# Patient Record
Sex: MALE | Race: WHITE
[De-identification: names, ages, dates, MRNs, and addresses within clinical notes are randomized per-mention and may not be internally consistent; named-entity substitution may affect disease eponyms.]

---

## 2018-05-31 ENCOUNTER — HOSPITAL ENCOUNTER (OUTPATIENT)
Dept: HOSPITAL 19 - COL.RAD | Age: 73
End: 2018-05-31
Attending: UROLOGY
Payer: MEDICARE

## 2018-05-31 DIAGNOSIS — N50.3: Primary | ICD-10-CM

## 2019-02-26 ENCOUNTER — HOSPITAL ENCOUNTER (INPATIENT)
Dept: HOSPITAL 19 - COL.ER | Age: 74
LOS: 3 days | Discharge: HOME | DRG: 419 | End: 2019-03-01
Attending: HOSPITALIST | Admitting: HOSPITALIST
Payer: MEDICARE

## 2019-02-26 VITALS — HEIGHT: 69 IN | BODY MASS INDEX: 27 KG/M2 | WEIGHT: 182.32 LBS

## 2019-02-26 DIAGNOSIS — I45.10: ICD-10-CM

## 2019-02-26 DIAGNOSIS — K80.00: Primary | ICD-10-CM

## 2019-02-26 DIAGNOSIS — R00.1: ICD-10-CM

## 2019-02-26 DIAGNOSIS — Z82.49: ICD-10-CM

## 2019-02-26 DIAGNOSIS — K21.9: ICD-10-CM

## 2019-02-26 DIAGNOSIS — N40.0: ICD-10-CM

## 2019-02-26 LAB
ALBUMIN SERPL-MCNC: 4.1 GM/DL (ref 3.5–5)
ALP SERPL-CCNC: 83 U/L (ref 50–136)
ALT SERPL-CCNC: 27 U/L (ref 21–72)
ANION GAP SERPL CALC-SCNC: 9 MMOL/L (ref 7–16)
AST SERPL-CCNC: 29 U/L (ref 15–37)
BASOPHILS # BLD: 0 10*3/UL (ref 0–0.2)
BASOPHILS NFR BLD AUTO: 0.4 % (ref 0–2)
BILIRUB SERPL-MCNC: 1.2 MG/DL (ref 0–1)
BUN SERPL-MCNC: 18 MG/DL (ref 9–20)
CALCIUM SERPL-MCNC: 9.1 MG/DL (ref 8.4–10.2)
CHLORIDE SERPL-SCNC: 104 MMOL/L (ref 98–107)
CO2 SERPL-SCNC: 24 MMOL/L (ref 22–30)
CREAT SERPL-SCNC: 0.89 MG/DL (ref 0.66–1.25)
CRP SERPL-MCNC: < 0.5 MG/DL (ref 0–0.9)
EOSINOPHIL # BLD: 0.1 10*3/UL (ref 0–0.7)
EOSINOPHIL NFR BLD: 1 % (ref 0–4)
ERYTHROCYTE [DISTWIDTH] IN BLOOD BY AUTOMATED COUNT: 12.3 % (ref 11.5–14.5)
GLUCOSE SERPL-MCNC: 118 MG/DL (ref 74–106)
GRANULOCYTES # BLD AUTO: 67.9 % (ref 42.2–75.2)
HCT VFR BLD AUTO: 44.9 % (ref 42–52)
HGB BLD-MCNC: 15.9 G/DL (ref 13.5–18)
LIPASE SERPL-CCNC: 217 U/L (ref 23–300)
LYMPHOCYTES # BLD: 2 10*3/UL (ref 1.2–3.4)
LYMPHOCYTES NFR BLD: 22.1 % (ref 20–51)
MCH RBC QN AUTO: 31 PG (ref 27–31)
MCHC RBC AUTO-ENTMCNC: 35 G/DL (ref 33–37)
MCV RBC AUTO: 86 FL (ref 80–100)
MONOCYTES # BLD: 0.7 10*3/UL (ref 0.1–0.6)
MONOCYTES NFR BLD AUTO: 8.2 % (ref 1.7–9.3)
NEUTROPHILS # BLD: 6 10*3/UL (ref 1.4–6.5)
PLATELET # BLD AUTO: 199 K/MM3 (ref 130–400)
PMV BLD AUTO: 9.1 FL (ref 7.4–10.4)
POTASSIUM SERPL-SCNC: 3.7 MMOL/L (ref 3.4–5)
PROT SERPL-MCNC: 7.6 GM/DL (ref 6.4–8.2)
RBC # BLD AUTO: 5.21 M/MM3 (ref 4.2–5.6)
SODIUM SERPL-SCNC: 136 MMOL/L (ref 137–145)
TROPONIN I SERPL-MCNC: < 0.012 NG/ML (ref 0–0.04)

## 2019-02-26 PROCEDURE — A9500 TC99M SESTAMIBI: HCPCS

## 2019-02-26 PROCEDURE — C1760 CLOSURE DEV, VASC: HCPCS

## 2019-02-26 PROCEDURE — A4216 STERILE WATER/SALINE, 10 ML: HCPCS

## 2019-02-26 PROCEDURE — C1894 INTRO/SHEATH, NON-LASER: HCPCS

## 2019-02-27 VITALS — OXYGEN SATURATION: 97 %

## 2019-02-27 VITALS — SYSTOLIC BLOOD PRESSURE: 120 MMHG | HEART RATE: 52 BPM | DIASTOLIC BLOOD PRESSURE: 67 MMHG | TEMPERATURE: 98.6 F

## 2019-02-27 VITALS — OXYGEN SATURATION: 96 %

## 2019-02-27 VITALS — SYSTOLIC BLOOD PRESSURE: 126 MMHG | DIASTOLIC BLOOD PRESSURE: 79 MMHG | HEART RATE: 54 BPM

## 2019-02-27 VITALS — OXYGEN SATURATION: 94 %

## 2019-02-27 VITALS — OXYGEN SATURATION: 95 %

## 2019-02-27 VITALS — TEMPERATURE: 98 F | HEART RATE: 54 BPM | DIASTOLIC BLOOD PRESSURE: 57 MMHG | SYSTOLIC BLOOD PRESSURE: 148 MMHG

## 2019-02-27 VITALS — HEART RATE: 111 BPM | SYSTOLIC BLOOD PRESSURE: 116 MMHG | DIASTOLIC BLOOD PRESSURE: 62 MMHG

## 2019-02-27 VITALS — TEMPERATURE: 98.2 F | HEART RATE: 50 BPM | DIASTOLIC BLOOD PRESSURE: 52 MMHG | SYSTOLIC BLOOD PRESSURE: 98 MMHG

## 2019-02-27 VITALS — HEART RATE: 45 BPM | TEMPERATURE: 98 F | SYSTOLIC BLOOD PRESSURE: 99 MMHG | DIASTOLIC BLOOD PRESSURE: 54 MMHG

## 2019-02-27 VITALS — OXYGEN SATURATION: 99 %

## 2019-02-27 VITALS — DIASTOLIC BLOOD PRESSURE: 81 MMHG | SYSTOLIC BLOOD PRESSURE: 154 MMHG | HEART RATE: 82 BPM

## 2019-02-27 VITALS — TEMPERATURE: 98.2 F | SYSTOLIC BLOOD PRESSURE: 98 MMHG | HEART RATE: 50 BPM | DIASTOLIC BLOOD PRESSURE: 52 MMHG

## 2019-02-27 VITALS — OXYGEN SATURATION: 98 %

## 2019-02-27 VITALS — DIASTOLIC BLOOD PRESSURE: 79 MMHG | SYSTOLIC BLOOD PRESSURE: 126 MMHG | TEMPERATURE: 98.1 F | HEART RATE: 54 BPM

## 2019-02-27 VITALS — SYSTOLIC BLOOD PRESSURE: 98 MMHG | DIASTOLIC BLOOD PRESSURE: 52 MMHG | HEART RATE: 50 BPM

## 2019-02-27 VITALS — OXYGEN SATURATION: 93 %

## 2019-02-27 VITALS — HEART RATE: 86 BPM | SYSTOLIC BLOOD PRESSURE: 142 MMHG | DIASTOLIC BLOOD PRESSURE: 81 MMHG

## 2019-02-27 VITALS — OXYGEN SATURATION: 88 %

## 2019-02-27 VITALS — DIASTOLIC BLOOD PRESSURE: 57 MMHG | SYSTOLIC BLOOD PRESSURE: 113 MMHG | HEART RATE: 44 BPM

## 2019-02-27 VITALS — SYSTOLIC BLOOD PRESSURE: 147 MMHG | HEART RATE: 94 BPM | DIASTOLIC BLOOD PRESSURE: 83 MMHG

## 2019-02-27 LAB
ALBUMIN SERPL-MCNC: 3.5 GM/DL (ref 3.5–5)
ALBUMIN SERPL-MCNC: 3.6 GM/DL (ref 3.5–5)
ALP SERPL-CCNC: 71 U/L (ref 50–136)
ALP SERPL-CCNC: 82 U/L (ref 50–136)
ALT SERPL-CCNC: 27 U/L (ref 21–72)
ALT SERPL-CCNC: 31 U/L (ref 21–72)
ANION GAP SERPL CALC-SCNC: 5 MMOL/L (ref 7–16)
ANION GAP SERPL CALC-SCNC: 7 MMOL/L (ref 7–16)
APTT PPP: 31.8 SECONDS (ref 26–37)
AST SERPL-CCNC: 24 U/L (ref 15–37)
AST SERPL-CCNC: 24 U/L (ref 15–37)
BASOPHILS # BLD: 0 10*3/UL (ref 0–0.2)
BASOPHILS # BLD: 0 10*3/UL (ref 0–0.2)
BASOPHILS NFR BLD AUTO: 0.3 % (ref 0–2)
BASOPHILS NFR BLD AUTO: 0.4 % (ref 0–2)
BILIRUB SERPL-MCNC: 1.5 MG/DL (ref 0–1)
BILIRUB SERPL-MCNC: 2.1 MG/DL (ref 0–1)
BUN SERPL-MCNC: 16 MG/DL (ref 9–20)
BUN SERPL-MCNC: 16 MG/DL (ref 9–20)
CALCIUM SERPL-MCNC: 8.5 MG/DL (ref 8.4–10.2)
CALCIUM SERPL-MCNC: 8.7 MG/DL (ref 8.4–10.2)
CHLORIDE SERPL-SCNC: 106 MMOL/L (ref 98–107)
CHLORIDE SERPL-SCNC: 106 MMOL/L (ref 98–107)
CHOLEST SPEC-SCNC: 157 MG/DL (ref 120–200)
CHOLEST/HDLC SERPL-SRTO: 4.7
CO2 SERPL-SCNC: 25 MMOL/L (ref 22–30)
CO2 SERPL-SCNC: 27 MMOL/L (ref 22–30)
CREAT SERPL-SCNC: 0.92 MG/DL (ref 0.66–1.25)
CREAT SERPL-SCNC: 0.95 MG/DL (ref 0.66–1.25)
EOSINOPHIL # BLD: 0 10*3/UL (ref 0–0.7)
EOSINOPHIL # BLD: 0.3 10*3/UL (ref 0–0.7)
EOSINOPHIL NFR BLD: 0.2 % (ref 0–4)
EOSINOPHIL NFR BLD: 3 % (ref 0–4)
ERYTHROCYTE [DISTWIDTH] IN BLOOD BY AUTOMATED COUNT: 12.4 % (ref 11.5–14.5)
ERYTHROCYTE [DISTWIDTH] IN BLOOD BY AUTOMATED COUNT: 12.5 % (ref 11.5–14.5)
GLUCOSE SERPL-MCNC: 128 MG/DL (ref 74–106)
GLUCOSE SERPL-MCNC: 94 MG/DL (ref 74–106)
GRANULOCYTES # BLD AUTO: 60.7 % (ref 42.2–75.2)
GRANULOCYTES # BLD AUTO: 81.8 % (ref 42.2–75.2)
HCT VFR BLD AUTO: 40.9 % (ref 42–52)
HCT VFR BLD AUTO: 41.1 % (ref 42–52)
HDLC SERPL-MCNC: 33 MG/DL
HGB BLD-MCNC: 14.4 G/DL (ref 13.5–18)
HGB BLD-MCNC: 14.5 G/DL (ref 13.5–18)
INR BLD: 1.1 (ref 0.8–3)
LDLC SERPL-MCNC: 106 MG/DL
LYMPHOCYTES # BLD: 1.2 10*3/UL (ref 1.2–3.4)
LYMPHOCYTES # BLD: 2.6 10*3/UL (ref 1.2–3.4)
LYMPHOCYTES NFR BLD: 10.6 % (ref 20–51)
LYMPHOCYTES NFR BLD: 26.8 % (ref 20–51)
MAGNESIUM SERPL-MCNC: 2 MG/DL (ref 1.6–2.3)
MCH RBC QN AUTO: 31 PG (ref 27–31)
MCH RBC QN AUTO: 31 PG (ref 27–31)
MCHC RBC AUTO-ENTMCNC: 35 G/DL (ref 33–37)
MCHC RBC AUTO-ENTMCNC: 36 G/DL (ref 33–37)
MCV RBC AUTO: 86 FL (ref 80–100)
MCV RBC AUTO: 87 FL (ref 80–100)
MONOCYTES # BLD: 0.8 10*3/UL (ref 0.1–0.6)
MONOCYTES # BLD: 0.9 10*3/UL (ref 0.1–0.6)
MONOCYTES NFR BLD AUTO: 6.6 % (ref 1.7–9.3)
MONOCYTES NFR BLD AUTO: 8.9 % (ref 1.7–9.3)
NEUTROPHILS # BLD: 5.9 10*3/UL (ref 1.4–6.5)
NEUTROPHILS # BLD: 9.4 10*3/UL (ref 1.4–6.5)
PH UR STRIP.AUTO: 8 [PH] (ref 5–8)
PLATELET # BLD AUTO: 177 K/MM3 (ref 130–400)
PLATELET # BLD AUTO: 180 K/MM3 (ref 130–400)
PMV BLD AUTO: 9.2 FL (ref 7.4–10.4)
PMV BLD AUTO: 9.2 FL (ref 7.4–10.4)
POTASSIUM SERPL-SCNC: 3.7 MMOL/L (ref 3.4–5)
POTASSIUM SERPL-SCNC: 4 MMOL/L (ref 3.4–5)
PROT SERPL-MCNC: 6.4 GM/DL (ref 6.4–8.2)
PROT SERPL-MCNC: 6.6 GM/DL (ref 6.4–8.2)
PROTHROMBIN TIME: 12.2 SECONDS (ref 9.7–12.8)
RBC # BLD AUTO: 4.72 M/MM3 (ref 4.2–5.6)
RBC # BLD AUTO: 4.74 M/MM3 (ref 4.2–5.6)
RBC # UR: (no result) /HPF
SODIUM SERPL-SCNC: 138 MMOL/L (ref 137–145)
SODIUM SERPL-SCNC: 139 MMOL/L (ref 137–145)
SP GR UR STRIP.AUTO: 1.01 (ref 1–1.03)
SQUAMOUS # URNS: (no result) /HPF
TRIGL SERPL-MCNC: 88 MG/DL
TROPONIN I SERPL-MCNC: < 0.012 NG/ML (ref 0–0.04)
URN COLLECT METHOD CLASS: (no result)

## 2019-02-27 NOTE — NUR
Pt arrived to ICU05 with personal belonings. Pt was able to transfer from one
bed to the other by scooting.

## 2019-02-27 NOTE — NUR
This nurse arrived to medical floor room # 317. Pt was resting in bed talking
with medical floor nurse at this time, in a semi fowlers position. Pt sat on
the edge of the bed and reported feeling nauseous. Pt shortly following began
to produce yellow colored emesis in small amounts into a kidney basin. Zofran
was administered with mild results, pt rested back into right sided laying
position in bed with eyes closed. Alcohol swab was reported to help with
nausea when placed for short periods of time under nose. House supervisor
called for assistance to tx pt via bed with IV pole of Morphine PCA pump and
NS down to ICU.

## 2019-02-27 NOTE — NUR
Assessment completed. VSS. Pt resting comfortably in bed, c/o no pain.
Bradycardia apical rate 53 bpm. IV patent to L forearm w/ NS @ 125 mL/hr.
Telemetry on. No further needs.

## 2019-02-27 NOTE — NUR
Ultrasound here in pt room. UA obtained and sent to lab. Assesment unchanged.
C/o no pain and no further complaints at this time.

## 2019-02-27 NOTE — NUR
Pt's pain not being managed with PRN pain med Morphine. Jessica updated. Pt
resting in bed in fetal position. Pt VS monitored and respirations 20. Pt has
call light in reach.

## 2019-02-27 NOTE — NUR
Pt arrived to room 317, transferred per wheelchair by ED staff. Pt awake, a&o,
cooperative c cares. Denies pain or other c/o at this time. INT patent. Tele
in place. Pt oriented to room, unit policies et current POC. Questions invited
et answered, pt verbalizes understanding. Pt denies needs. Call light in
reach, will continue c admit process.

## 2019-02-27 NOTE — NUR
SW and SW student attended clinical rounding and met with patient to discuss
discharge planning.  Patient lives alone in [facility] and is independent with
ADLs.  Patient has a daughter that lives in [place].  Patients PCP is Dr ferrera and he obtains his medications from [facility].  Patient does not have
any anticipated discharge needs however sw will continue to follow.

## 2019-02-27 NOTE — NUR
Pt alert and oriented and started on PCA morphine pump. Pt VS monitored and
respiration around 20. Pt pain rated at 5/10. Pt remains in bed in fetal
position and pain in abdomen noted. Pt seen by Dr. Grey and being
transferred to ICU when bed is ready. Pt aware and has call light in reach.
Report given to Ara in ICU and Марина on medical.

## 2019-02-27 NOTE — NUR
Pt alert and oriented. Pt rates pain 1/10 in right epigastric area. Pt taken
down for Lexiscan this am and still there now. Pt am assessment completed. Pt
has student nurse this am. Pt denies needs at this time. Pt independent in
room.

## 2019-02-27 NOTE — NUR
PT update report given to ICU; PT transferred to ICU via bed x2 nurse transfer
at approximately 2120; chart and all personal belongings transferred with PT
at time of move.   CDA

## 2019-02-28 VITALS — OXYGEN SATURATION: 94 %

## 2019-02-28 VITALS — OXYGEN SATURATION: 95 %

## 2019-02-28 VITALS — OXYGEN SATURATION: 96 %

## 2019-02-28 VITALS — OXYGEN SATURATION: 93 %

## 2019-02-28 VITALS — OXYGEN SATURATION: 100 %

## 2019-02-28 VITALS — OXYGEN SATURATION: 99 %

## 2019-02-28 VITALS — OXYGEN SATURATION: 97 %

## 2019-02-28 VITALS — OXYGEN SATURATION: 98 %

## 2019-02-28 VITALS
OXYGEN SATURATION: 94 % | TEMPERATURE: 98 F | SYSTOLIC BLOOD PRESSURE: 92 MMHG | DIASTOLIC BLOOD PRESSURE: 49 MMHG | HEART RATE: 49 BPM

## 2019-02-28 VITALS — OXYGEN SATURATION: 92 %

## 2019-02-28 VITALS — OXYGEN SATURATION: 91 %

## 2019-02-28 PROCEDURE — 4A023N8 MEASUREMENT OF CARDIAC SAMPLING AND PRESSURE, BILATERAL, PERCUTANEOUS APPROACH: ICD-10-PCS | Performed by: INTERNAL MEDICINE

## 2019-02-28 PROCEDURE — B2111ZZ FLUOROSCOPY OF MULTIPLE CORONARY ARTERIES USING LOW OSMOLAR CONTRAST: ICD-10-PCS | Performed by: INTERNAL MEDICINE

## 2019-03-01 VITALS — OXYGEN SATURATION: 88 %

## 2019-03-01 VITALS — OXYGEN SATURATION: 90 %

## 2019-03-01 VITALS — HEART RATE: 63 BPM | DIASTOLIC BLOOD PRESSURE: 58 MMHG | SYSTOLIC BLOOD PRESSURE: 111 MMHG | TEMPERATURE: 98.6 F

## 2019-03-01 VITALS — OXYGEN SATURATION: 91 %

## 2019-03-01 VITALS — OXYGEN SATURATION: 61 %

## 2019-03-01 VITALS — OXYGEN SATURATION: 89 %

## 2019-03-01 VITALS — OXYGEN SATURATION: 83 %

## 2019-03-01 VITALS — OXYGEN SATURATION: 93 %

## 2019-03-01 LAB
ALBUMIN SERPL-MCNC: 3.4 GM/DL (ref 3.5–5)
ALP SERPL-CCNC: 71 U/L (ref 50–136)
ALT SERPL-CCNC: 21 U/L (ref 21–72)
ANION GAP SERPL CALC-SCNC: 7 MMOL/L (ref 7–16)
ANION GAP SERPL CALC-SCNC: 7 MMOL/L (ref 7–16)
AST SERPL-CCNC: 19 U/L (ref 15–37)
BASOPHILS # BLD: 0 10*3/UL (ref 0–0.2)
BASOPHILS # BLD: 0 10*3/UL (ref 0–0.2)
BASOPHILS NFR BLD AUTO: 0.2 % (ref 0–2)
BASOPHILS NFR BLD AUTO: 0.2 % (ref 0–2)
BILIRUB SERPL-MCNC: 2 MG/DL (ref 0–1)
BUN SERPL-MCNC: 20 MG/DL (ref 9–20)
BUN SERPL-MCNC: 20 MG/DL (ref 9–20)
CALCIUM SERPL-MCNC: 8.2 MG/DL (ref 8.4–10.2)
CALCIUM SERPL-MCNC: 8.2 MG/DL (ref 8.4–10.2)
CHLORIDE SERPL-SCNC: 106 MMOL/L (ref 98–107)
CHLORIDE SERPL-SCNC: 106 MMOL/L (ref 98–107)
CO2 SERPL-SCNC: 26 MMOL/L (ref 22–30)
CO2 SERPL-SCNC: 26 MMOL/L (ref 22–30)
CREAT SERPL-SCNC: 1.03 MG/DL (ref 0.66–1.25)
CREAT SERPL-SCNC: 1.03 MG/DL (ref 0.66–1.25)
EOSINOPHIL # BLD: 0 10*3/UL (ref 0–0.7)
EOSINOPHIL # BLD: 0.1 10*3/UL (ref 0–0.7)
EOSINOPHIL NFR BLD: 0.2 % (ref 0–4)
EOSINOPHIL NFR BLD: 0.4 % (ref 0–4)
ERYTHROCYTE [DISTWIDTH] IN BLOOD BY AUTOMATED COUNT: 12.7 % (ref 11.5–14.5)
ERYTHROCYTE [DISTWIDTH] IN BLOOD BY AUTOMATED COUNT: 12.8 % (ref 11.5–14.5)
GLUCOSE SERPL-MCNC: 107 MG/DL (ref 74–106)
GLUCOSE SERPL-MCNC: 107 MG/DL (ref 74–106)
GRANULOCYTES # BLD AUTO: 75.1 % (ref 42.2–75.2)
GRANULOCYTES # BLD AUTO: 79.1 % (ref 42.2–75.2)
HCT VFR BLD AUTO: 38.4 % (ref 42–52)
HCT VFR BLD AUTO: 39.3 % (ref 42–52)
HGB BLD-MCNC: 13.2 G/DL (ref 13.5–18)
HGB BLD-MCNC: 13.5 G/DL (ref 13.5–18)
LYMPHOCYTES # BLD: 1.3 10*3/UL (ref 1.2–3.4)
LYMPHOCYTES # BLD: 1.8 10*3/UL (ref 1.2–3.4)
LYMPHOCYTES NFR BLD: 14.1 % (ref 20–51)
LYMPHOCYTES NFR BLD: 9.8 % (ref 20–51)
MCH RBC QN AUTO: 30 PG (ref 27–31)
MCH RBC QN AUTO: 31 PG (ref 27–31)
MCHC RBC AUTO-ENTMCNC: 34 G/DL (ref 33–37)
MCHC RBC AUTO-ENTMCNC: 34 G/DL (ref 33–37)
MCV RBC AUTO: 88 FL (ref 80–100)
MCV RBC AUTO: 89 FL (ref 80–100)
MONOCYTES # BLD: 1.2 10*3/UL (ref 0.1–0.6)
MONOCYTES # BLD: 1.3 10*3/UL (ref 0.1–0.6)
MONOCYTES NFR BLD AUTO: 10.3 % (ref 1.7–9.3)
MONOCYTES NFR BLD AUTO: 9.6 % (ref 1.7–9.3)
NEUTROPHILS # BLD: 10.1 10*3/UL (ref 1.4–6.5)
NEUTROPHILS # BLD: 9.7 10*3/UL (ref 1.4–6.5)
PLATELET # BLD AUTO: 163 K/MM3 (ref 130–400)
PLATELET # BLD AUTO: 168 K/MM3 (ref 130–400)
PMV BLD AUTO: 9.3 FL (ref 7.4–10.4)
PMV BLD AUTO: 9.4 FL (ref 7.4–10.4)
POTASSIUM SERPL-SCNC: 3.8 MMOL/L (ref 3.4–5)
POTASSIUM SERPL-SCNC: 3.8 MMOL/L (ref 3.4–5)
PROT SERPL-MCNC: 6.4 GM/DL (ref 6.4–8.2)
RBC # BLD AUTO: 4.33 M/MM3 (ref 4.2–5.6)
RBC # BLD AUTO: 4.45 M/MM3 (ref 4.2–5.6)
SODIUM SERPL-SCNC: 139 MMOL/L (ref 137–145)
SODIUM SERPL-SCNC: 139 MMOL/L (ref 137–145)

## 2019-03-01 PROCEDURE — 0FT44ZZ RESECTION OF GALLBLADDER, PERCUTANEOUS ENDOSCOPIC APPROACH: ICD-10-PCS | Performed by: SURGERY
